# Patient Record
Sex: FEMALE | Race: WHITE
[De-identification: names, ages, dates, MRNs, and addresses within clinical notes are randomized per-mention and may not be internally consistent; named-entity substitution may affect disease eponyms.]

---

## 2022-06-16 ENCOUNTER — HOSPITAL ENCOUNTER (EMERGENCY)
Dept: HOSPITAL 97 - ER | Age: 57
Discharge: HOME | End: 2022-06-16
Payer: SELF-PAY

## 2022-06-16 VITALS — DIASTOLIC BLOOD PRESSURE: 81 MMHG | SYSTOLIC BLOOD PRESSURE: 159 MMHG | OXYGEN SATURATION: 98 %

## 2022-06-16 VITALS — TEMPERATURE: 97.7 F

## 2022-06-16 DIAGNOSIS — I10: ICD-10-CM

## 2022-06-16 DIAGNOSIS — Z88.0: ICD-10-CM

## 2022-06-16 DIAGNOSIS — R07.9: Primary | ICD-10-CM

## 2022-06-16 DIAGNOSIS — N28.9: ICD-10-CM

## 2022-06-16 DIAGNOSIS — E11.9: ICD-10-CM

## 2022-06-16 DIAGNOSIS — F17.210: ICD-10-CM

## 2022-06-16 LAB
BUN BLD-MCNC: 19 MG/DL (ref 7–18)
GLUCOSE SERPLBLD-MCNC: 183 MG/DL (ref 74–106)
HCT VFR BLD CALC: 41.2 % (ref 36–45)
LYMPHOCYTES # SPEC AUTO: 2.5 K/UL (ref 0.7–4.9)
PMV BLD: 7.9 FL (ref 7.6–11.3)
POTASSIUM SERPL-SCNC: 3.7 MMOL/L (ref 3.5–5.1)
RBC # BLD: 4.56 M/UL (ref 3.86–4.86)
TROPONIN I SERPL HS-MCNC: 43 PG/ML (ref ?–58.9)

## 2022-06-16 PROCEDURE — 80048 BASIC METABOLIC PNL TOTAL CA: CPT

## 2022-06-16 PROCEDURE — 36415 COLL VENOUS BLD VENIPUNCTURE: CPT

## 2022-06-16 PROCEDURE — 93005 ELECTROCARDIOGRAM TRACING: CPT

## 2022-06-16 PROCEDURE — 84484 ASSAY OF TROPONIN QUANT: CPT

## 2022-06-16 PROCEDURE — 71045 X-RAY EXAM CHEST 1 VIEW: CPT

## 2022-06-16 PROCEDURE — 85025 COMPLETE CBC W/AUTO DIFF WBC: CPT

## 2022-06-16 PROCEDURE — 99283 EMERGENCY DEPT VISIT LOW MDM: CPT

## 2022-06-16 NOTE — XMS REPORT
Continuity of Care Document

                            Created on:2022



Patient:BREANNA MITCHELL

Sex:Female

:1965

External Reference #:[id number]





Demographics







                          Address                   [address]

[address]



                                                    [place] [address]

 

                          Home Phone                [phone]

 

                          Work Phone                [phone]

 

                          Mobile Phone              [phone]

 

                          Email Address             [place]

 

                          Preferred Language        English

 

                          Marital Status            Unknown

 

                          Mu-ism Affiliation     Unknown

 

                          Race                      Unknown

 

                          Additional Race(s)        Unavailable

 

                          Ethnic Group              Unknown









Author







                          Organization              [facility]

t

 

                          Address                   [address]



                                                    [place] [address]

 

                          Phone                     [phone]









Support







                Name            Relationship    Address         Phone

 

                Raymond Unavailable     [address]       [phone]



                                                [place] [address] 

 

                Starr          Unavailable     Unavailable     [phone]









Care Team Providers







                    Name                Role                Phone

 

                    DORIAN Aguilar           Attending Clinician Unavailable

 

                    DORIAN Nelson MD     Attending Clinician [phone]

 

                    Doctor Unassigned,  Name Attending Clinician Unavailable









Payers







           Payer Name Policy Type Policy Number Effective Date Expiration Date S

ource







Problems

This patient has no known problems.



Allergies, Adverse Reactions, Alerts







       Allergy Allergy Status Severity Reaction(s) Onset  Inactive Treating Comm

ents 

Source



       Name   Type                        Date   Date   Clinician        

 

       Penicill Propensi Active        Unknown -                       Uni

vers



       ins    ty to                See comments 3-11                        ity 

of



              adverse                      00:00:                      [place]



              reaction                      00                          Medical



              s                                                       Branch

 

       penicill Adverse Active        Info Not                             Commo

n



       in     Reaction               Available                             Spiri

t



                                                                      - [facility]







Social History







           Social Habit Start Date Stop Date  Quantity   Comments   Source

 

           Alcohol intake                                             [facility]

 

           Sex Assigned At                                             [facility]                                                  Medical Branch

 

           Cigarettes smoked 2019                       [facility]



           current (pack per 00:00:00   00:00:00                         Medical

 Branch



           day) - Reported                                             









                Smoking Status  Start Date      Stop Date       Source

 

                Current every day smoker 2019 00:00:00                 Uni

[facility]







Medications







       Ordered Filled Start  Stop   Current Ordering Indication Dosage Frequency

 Signature

                    Comments            Components          Source



     Medication Medication Date Date Medication? Clinician                (SIG) 

          



     Name Name                                                   

 

     Jardiance Jardiance  2020- No   Dc                1 tablet       

    Common



               -15      Aguilar                               Spirit



               00:00: 00:00                                         - [facility]



               00   :00                                          [facility]

 

     Amoxicillin Amoxicillin 2020- No   [facility]                1 capsule  

         Common



                     Aguilar                               Spirit



               00:00: 00:00                                         - [facility]



               00   :00                                          [facility]

 

     barium      -0 2019- No             680g      680 g,           [facility]



     sulfate                                Oral,           ity of



     (LIQUID E-Z      15:30: 13:50                          ONCE, 1           Fei PINEDA) 60 %      00   :00                           dose, Tue           Med

ical



     (w/v) oral                                         19 at           [facility]



     suspension                                         1045,           



     680 g                                         Routine           

 

     ibuprofen            Yes                      Take  by           Univ

ers



     (ADVIL      4-16                               mouth.           ity of



     ORAL)      13:55:                                              [address]

 

     ibuprofen            Yes                      Take  by           Univ

ers



     (ADVIL      4-16                               mouth.           ity of



     ORAL)      13:55:                                              [address]

 

     ibuprofen            Yes                      Take  by           Univ

ers



     (ADVIL      4-16                               mouth.           ity of



     ORAL)      13:55:                                              [address]

 

     ibuprofen            Yes                      Take  by           Univ

ers



     (ADVIL      4-16                               mouth.           ity of



     ORAL)      13:55:                                              [address]

 

     glipiZIDE            Yes       [id number] 10mg      Take 1           U

nivers



     XL 10 mg 24      4-16                               tablet by           ity

 of



     hr tablet      00:00:                               mouth           [place]



               00                                 daily with           Medical



                                                  breakfast.           Branch

 

     metformin            Yes       [id number] 1000mg      Take 2          

 [facility]



      mg      4-16                               tablets by           ity 

of



     24 hr      00:00:                               mouth 2           [place]



     tablet      00                                 (two)           Medical



                                                  times           Branch



                                                  daily with           



                                                  meals.           

 

     glipiZIDE            Yes       [id number] 10mg      Take 1           U

nivers



     XL 10 mg 24      4-16                               tablet by           ity

 of



     hr tablet      00:00:                               mouth           [place]



               00                                 daily with           Medical



                                                  breakfast.           Branch

 

     metformin            Yes       [id number] 1000mg      Take 2          

 [facility]



      mg      4-16                               tablets by           ity 

of



     24 hr      00:00:                               mouth 2           [place]



     tablet      00                                 (two)           Medical



                                                  times           Branch



                                                  daily with           



                                                  meals.           

 

     glipiZIDE            Yes       [id number] 10mg      Take 1           U

nivers



     XL 10 mg 24      4-16                               tablet by           ity

 of



     hr tablet      00:00:                               mouth           [place]



               00                                 daily with           Medical



                                                  breakfast.           Branch

 

     metformin            Yes       [id number] 1000mg      Take 2          

 [facility]



      mg      4-16                               tablets by           ity 

of



     24 hr      00:00:                               mouth 2           [place]



     tablet      00                                 (two)           Medical



                                                  times           Branch



                                                  daily with           



                                                  meals.           

 

     glipiZIDE            Yes       [id number] 10mg      Take 1           U

nivers



     XL 10 mg 24      4-16                               tablet by           ity

 of



     hr tablet      00:00:                               mouth           [place]



               00                                 daily with           Medical



                                                  breakfast.           Branch

 

     metformin            Yes       [id number] 1000mg      Take 2          

 Univers



      mg      4-16                               tablets by           ity 

of



     24 hr      00:00:                               mouth 2           [place]



     tablet      00                                 (two)           Medical



                                                  times           Branch



                                                  daily with           



                                                  meals.           

 

     ALBUTEROL            Yes                      INHALE 1-2           Un

linda



     SULFATE HFA      3-12                               PUFFS Q           ity o

f



     INHALE      00:00:                               4-6 H           [place]



                                                               [facility]

 

     ALBUTEROL            Yes                      INHALE 1-2           Un

linda



     SULFATE HFA      3-12                               PUFFS Q           ity o

f



     INHALE      00:00:                               4-6 H           [place]



                                                               [facility]

 

     ALBUTEROL            Yes                      INHALE 1-2           Un

linda



     SULFATE HFA      3-12                               PUFFS Q           ity o

f



     INHALE      00:00:                               4-6 H           [place]



                                                               Medical



                                                                 [place]

 

     ALBUTEROL            Yes                      INHALE 1-2           Un

linda



     SULFATE HFA      3-12                               PUFFS Q           ity o

f



     INHALE      00:00:                               4-6 H           [place]



                                                               [facility]

 

     Lisinopril Lisinopril           Yes  Dc                1 tablet        

   Common



                              Aguilar                               [facility]

 

     Aspir-Low Aspir-Low           Yes  Dc                1 tablet          

 Common



                              Aguilar                               [facility]

 

     Ondansetron Ondansetron           Yes  Dc                1 tablet      

     Common



                              Aguilar                on the           Spirit



                                                  tongue and           - [facility]



                                                  allow to           [facility]

 

     Zinc Zinc           Yes  Dc                not            Common



                              Aguilar                defined           [facility]

 

     Magnesium Magnesium           Yes  Dc                not            Com

                  Aguilar                defined           [facility]

 

     Vitamin D3 Vitamin D3           Yes  Dc                not            C

ommon



                              Aguilar                defined           [facility]

 

     Januvia Januvia           Yes  Dc                1 tablet           Com

mon



                              Aguilar                               [facility]

 

     Famotidine Famotidine           Yes  Dc                1 tablet        

   Common



                              Aguilar                               [facility]

 

     Calcium Calcium           Yes  Dc                not            Common



                              Aguilar                defined           [facility]

 

     Ondansetron Ondansetron           Yes  Dc                TAKE 1        

   Common



     HCl  HCl                 Aguilar                TABLET BY           [facility]



                                                  MOUTH           - [facility]



                                                  EVERY 12           St



                                                  HOURS           [facility]

 

     Metoprolol Metoprolol           Yes  Dc                TAKE 1          

 Common



     Tartrate Tartrate                Aguilar                TABLET BY           S

pirit



                                                  MOUTH           - [facility]



                                                  TWICE           St



                                                  DAILY WITH           [facility]

 

     Omeprazole Omeprazole           Yes  Dc                TAKE ONE        

   Common



                              Aguilar                CAPSULE BY           [facility]



                                                  MOUTH           - [facility]



                                                  EVERY DAY           [facility]

 

     Hydroxychlo Hydroxychlo           Yes  Dc                1 tablet      

     Common



     roquine roquine                Aguilar                               Spirit



     Sulfate [facility]

 

     PredniSONE PredniSONE           Yes  Dc                1 tablet        

   Common



                              Aguilar                               [facility]

 

     Metoprolol Metoprolol           Yes  Dc                1 tablet        

   Common



     Tartrate Tartrate                Aguilar                with food           S

pirit



                                                                 [facility]

 

     Atorvastati Atorvastati           Yes  Dc                1 tablet      

     Common



     n Calcium n Calcium                Aguilar                               Spir

it



                                                                 [facility]

 

     Multi Multi           Yes  Dc                1 tablet           Common



     Vitamin Vitamin                Aguilar                               [facility]

 

     Multivitami Multivitami           Yes  Dc                not           

 Common



     n Adult n Adult                Aguilar                defined           [facility]

t



                                                                 [facility]







Procedures







                Procedure       Date / Time Performed Performing Clinician Jennifer KOROMA SMALL BOWEL SERIES 2019 15:28:48 Nik Nelson [facility]

 

                ASSIGNMENT OF BENEFITS 2019 13:20:20 Doctor Unassigned, No

 [facility]

 

                US ABDOMEN COMPLETE 2019 19:59:31 Nik Nelson [facility]

 

                ASSIGNMENT OF BENEFITS 2019 18:51:06 Doctor Unassigned, No

 [facility]







Encounters







        Start   End     Encounter Admission Attending Care    Care    Encounter 

Source



        Date/Time Date/Time Type    Type    Clinicians Facility Department ID   

   

 

        2022         Outpatient         Aguilar,  STLMLC  [facility]LC  [id number]

 Common



        08:58:00                         Dc                     [facility]

 

        2022         Outpatient         Aguilar,  STLMLC  STLMLC  [id number]

 Common



        14:05:27                         Dc                  [id number]   [facility]

 

        2022         Outpatient         Aguilar,  STLMLC  STLMLC  [id number]

 Common



        13:16:26                         Dc                  [id number]   [facility]

 

        2022         Outpatient         Aguilar,  STLMLC  STLC  [id number]

 Common



        12:42:00                         Dc                  [id number]   [facility]

 

        2022         Outpatient         Aguilar,  STLMLC  STLMLC  [id number]

 Common



        11:46:42                         Dc                  [id number]   [facility]

 

        2022         Outpatient         Aguilar,  STLMLC  STLMLC  [id number]

 Common



        11:25:36                         Dc                  [id number]   [facility]

 

        2022         Outpatient         Aguilar,  STLMLC  STLMLC  [id number]

 Common



        11:13:31                         Dc                  [id number]   [facility]

 

        2022         Outpatient         Aguilar,  STLMLC  STLMLC  [id number]

 Common



        11:13:02                         Dc                  [id number]   [facility]

 

        2022         Outpatient         Aguilar,  STLMLC  STLMLC  [id number]

 Common



        11:10:35                         Dc                  [id number]   [facility]

 

        2022         Outpatient         Aguilar,  STLMLC  STLMLC  [id number]

 Common



        11:10:00                         Dc                  [id number]   [facility]

 

        2022         Outpatient         Aguilar,  STLMLC  STLMLC  [id number]

 Common



        11:06:48                         Dc                  [id number]   [facility]

 

        2021 ambulatory                 STLMLC  STLMLC  [id number]

 Common



        00:00:00 00:00:00                                                 [facility]

 

        2021 Outpatient                 STLMLC  STLMLC  [id number]

 Common



        00:00:00 00:00:00                                                 [facility]

 

        2021 Outpatient                 STLMLC  STLMLC  [id number]

 Common



        00:00:00 00:00:00                                                 [facility]

 

        2021 Outpatient                 STLMLC  STLMLC  [id number]

 Common



        00:00:00 00:00:00                                                 [facility]

 

        2021 Outpatient                 STLMLC  STLMLC  [id number]

 Common



        00:00:00 00:00:00                                                 [facility]

 

        2021 Outpatient                 STLMLC  STLMLC  [id number]

 Common



        00:00:00 00:00:00                                                 [facility]

 

        2021 Outpatient                 STLMLC  STLMLC  [id number]

 Common



        00:00:00 00:00:00                                                 [facility]

 

        2020-11-10 2020-11-10 Outpatient                 STLMLC  STLMLC  [id number]

 Common



        00:00:00 00:00:00                                                 [facility]

 

        2020 Outpatient                 STLMLC  STLMLC  [id number]

 Common



        00:00:00 00:00:00                                                 [facility]

 

        2020 Outpatient                 STLMLC  STLMLC  [id number]

 Common



        00:00:00 00:00:00                                                 [facility]

 

        2020 Outpatient                 STLMLC  STLMLC  [id number]

 Common



        00:00:00 00:00:00                                                 [facility]

 

        2020 Outpatient                 [id number]

[id number] Common



        09:20:00 09:20:00                         [address]   [address]         Spir

it



                                                Drive   [facility]

 

        2020 Outpatient                 [id number]

[id number] Common



        14:15:00 14:15:00                         [place]   [address]         Spir

it



                                                Drive   [facility]

 

        2020 Outpatient                 [id number]

[id number] Common



        14:33:00 14:33:00                         [place]   [address]         Spir

it



                                                Drive   Family          [facility]

 

        2020 Outpatient                 [id number]

[id number] Common



        13:00:00 13:00:00                         [place]   [address]         Spir

it



                                                Drive   [facility]

 

        2020 Outpatient                 [id number]

[id number] Common



        10:30:00 10:30:00                         t [place]   [address]         Spir

it



                                                Drive   [facility]

 

        2020 Outpatient                 [id number]

[id number] Common



        14:35:00 14:35:00                         t [place]   [address]         Spir

it



                                                Drive   [facility]

 

        2019 Outpatient                 [id number]

[id number] Common



        10:00:00 10:00:00                         t [address]         Spir

it



                                                Drive   [facility]

 

        2019 Outpatient                 [id number]

[id number] Common



        08:01:00 08:01:00                          [address]         Spir

it



                                                Drive   [facility]

 

        2019 [facility]    [id number]

[id number] [facility]



        08:26:06 23:59:00 Encounter         Nik Howe [id number]        

 ity of



                                                [place] [id number] [place] [address]  [phone]         Medi

celia



                                                        807             Branch

 

        2019 Orders          Doctor  RENEA    [id number]

 [facility]



        00:00:00 00:00:00 Only            Unassigned, FRANCIA   [id number]       

  ity of



                                        [facility] [id number]         Marvin

as



                                                        [phone]         Medi

celia



                                                        [address]             Branch

 

        2019 [place] [id number]

[id number] [facility]



        13:53:35 23:59:00 Encounter         Nik Howe [id number]        

 ity of



                                                [place] [id number]         [facility]  [id number]         Medi

celia



                                                        [address]             [address]

 

        2019 Orders          Doctor  RENEA    [id number]

 [facility]



        00:00:00 00:00:00 Only            Unassigned, FRANCIA   [id number]       

  ity of



                                        [facility] [id number]         Marvin

as



                                                        [phone]         Medi

celia



                                                        [address]             Branch







Results







           Test       Test       Test       Results    Result     Source



           Description Time       Comments              Comments   

 

           FL SMALL BOWEL 2019-              HISTORY: Loss of weight.        

    [facility]     20                    TECHNIQUE: Abdominal            [place]

[facility]



                      15:34:21              radiographs were            Branch



                                            obtained. Subsequently            



                                            barium isadministered            



                                            orally and serial films            



                                            of the abdominal and            



                                            pelvis areobtained.            



                                            Digital spot images of            



                                            small bowel loops            



                                            including terminal            



                                            ileumwere obtained by            



                                            me. FINDINGS: 2            



                                            abdominal images showed            



                                            unremarkable intestinal            



                                            gas patternexcept for            



                                            mild constipation. No            



                                            calcified gallstones or            



                                            kidney stones.            



                                            Noaggressive bone            



                                            lesions. Barium flow            



                                            through the small bowel            



                                            appears unobstructed            



                                            with the rightside of            



                                            the colon visualized            



                                            within less than 1 hour.            



                                            Entire small            



                                            bowelincluding spot            



                                            films of the terminal            



                                            ileum show normal            



                                            mucosal pattern,caliber            



                                            as well as distribution            



                                            of small bowel loops in            



                                            the abdominal andpelvis.            



                                            CONCLUSIONS: Normal            



                                            small bowel series.            



                                            [facility], Radiant Results            



                                            Inft User - 2019            



                                            10:36 AM CDTHISTORY:            



                                            Loss of               



                                            weight.TECHNIQUE:            



                                            Abdominal radiographs            



                                            were obtained.            



                                            Subsequently barium            



                                            isadministered orally            



                                            and serial films of the            



                                            abdominal and pelvis            



                                            areobtained. Digital            



                                            spot images of small            



                                            bowel loops including            



                                            terminal ileumwere            



                                            obtained by me.FINDINGS:            



                                            2 abdominal images            



                                            showed unremarkable            



                                            intestinal gas            



                                            patternexcept for mild            



                                            constipation. No            



                                            calcified gallstones or            



                                            kidney stones.            



                                            Noaggressive bone            



                                            lesions.Barium flow            



                                            through the small bowel            



                                            appears unobstructed            



                                            with the rightside of            



                                            the colon visualized            



                                            within less than 1 hour.            



                                            Entire small            



                                            bowelincluding spot            



                                            films of the terminal            



                                            ileum show normal            



                                            mucosal pattern,caliber            



                                            as well as distribution            



                                            of small bowel loops in            



                                            the abdominal            



                                            andpelvis.CONCLUSIONS:            



                                            Normal small bowel            



                                            series.               

 

            ABDOMEN 2019-              * * * * * * * * ORIGINAL            

[facility]   08                    REPORT * * * * * * *            [facility]



                      20:05:56              *ULTRASOUND ABDOMEN:            [facility]





                                            HISTORY: Generalized            



                                            abdomen pain TECHNIQUE:            



                                            Sonographic evaluation            



                                            of the abdomen is            



                                            performed. FINDINGS:            



                                            Liver is normal in size,            



                                            shape and echotexture.            



                                            Liver measures 16cm. No            



                                            focal lesions are            



                                            present within the            



                                            liver. Portal venous            



                                            flow is inthe normal            



                                            direction. Portal vein            



                                            measures 11 mm. No            



                                            biliary tree dilationis            



                                            noted. Common bile duct            



                                            measures 2 mm.            



                                            Gallbladder is normal.            



                                            No gallstones, wall            



                                            thickening or            



                                            pericholecysticfluid is            



                                            present. Pancreas,            



                                            spleen are normal in            



                                            size shape and            



                                            echotexture.  Left            



                                            kidney is slightly            



                                            smaller compared to the            



                                            right. The echotexture            



                                            isnormal. No            



                                            hydronephrosis or            



                                            perirenal fluid is seen.            



                                            The right kidneymeasures            



                                            10.5 x 3.6 x 4.1 cm and            



                                            the left kidney measures            



                                            8.4 x 4.1 x 3.4cm. The            



                                            abdominal aorta is            



                                            normal in caliber. Mild            



                                            calcified plaque is seen            



                                            inthe abdominal aorta.            



                                            CONCLUSIONS:1. Size            



                                            discrepancy in the            



                                            kidneys with left being            



                                            smaller in            



                                            size.Examination is            



                                            otherwise within normal            



                                            [facility]            



                                            Results Inft User -            



                                            2019  3:08 PM CDT*            



                                            * * * * * * * ORIGINAL            



                                            REPORT * * * * * * *            



                                            *ULTRASOUND ABDOMEN:            



                                            HISTORY: Generalized            



                                            abdomen painTECHNIQUE:            



                                            Sonographic evaluation            



                                            of the abdomen is            



                                            performed.FINDINGS:            



                                            Liver is normal in size,            



                                            shape and echotexture.            



                                            Liver measures 16cm. No            



                                            focal lesions are            



                                            present within the            



                                            liver. Portal venous            



                                            flow is inthe normal            



                                            direction. Portal vein            



                                            measures 11 mm. No            



                                            biliary tree dilationis            



                                            noted. Common bile duct            



                                            measures 2 mm.            



                                            Gallbladder is normal.            



                                            No gallstones, wall            



                                            thickening or            



                                            pericholecysticfluid is            



                                            present.Pancreas, spleen            



                                            are normal in size shape            



                                            and echotexture. Left            



                                            kidney is slightly            



                                            smaller compared to the            



                                            right. The echotexture            



                                            isnormal. No            



                                            hydronephrosis or            



                                            perirenal fluid is seen.            



                                            The right kidneymeasures            



                                            10.5 x 3.6 x 4.1 cm and            



                                            the left kidney measures            



                                            8.4 x 4.1 x 3.4cm.The            



                                            abdominal aorta is            



                                            normal in caliber. Mild            



                                            calcified plaque is seen            



                                            inthe abdominal            



                                            aorta.CONCLUSIONS:1.            



                                            Size discrepancy in the            



                                            kidneys with left being            



                                            smaller in            



                                            size.Examination is            



                                            otherwise within normal            



                                            limits

## 2022-06-16 NOTE — ER
Nurse's Notes                                                                                     

 [facility]                                                                 

Name: Leonor Wallace                                                                  

Age: 57 yrs                                                                                       

Sex: Female                                                                                       

: 1965                                                                                   

MRN: [id number]                                                                                   

Arrival Date: 2022                                                                          

Time: 09:51                                                                                       

Account#: [id number]                                                                            

Bed 20                                                                                            

Private MD:                                                                                       

Diagnosis: Chest pain, unspecified;Renal insufficiency                                            

                                                                                                  

Presentation:                                                                                     

                                                                                             

09:52 Chief complaint: Patient states: pt out for blood pressure, cholesterol, and diabetes   ha  

      medications x2 weeks. pt reporting having chest pain and head aces. Coronavirus screen:     

      Vaccine status: Patient reports receiving the 2nd dose of the covid vaccine. Ebola          

      Screen: Patient denies travel to an Ebola-affected area in the 21 days before illness       

      onset. Initial Sepsis Screen: Does the patient meet any 2 criteria? No. Patient's           

      initial sepsis screen is negative. Does the patient have a suspected source of              

      infection? No. Patient's initial sepsis screen is negative. Risk Assessment: Do you         

      want to hurt yourself or someone else?. Onset of symptoms was 2022.                

09:52 Method Of Arrival: EMS: [facility]                                                       ha  

09:52 Acuity: MIGUELANGEL 3                                                                           ha  

                                                                                                  

Triage Assessment:                                                                                

10:05 General: Appears in no apparent distress. Behavior is calm, cooperative.                ha  

                                                                                                  

Historical:                                                                                       

- Allergies:                                                                                      

10:03 PENICILLINS;                                                                            ha  

- Home Meds:                                                                                      

10:03 metformin 500 mg Oral tab 1 tab 2 times per day [Active]; metoprolol succinate 25 mg    ha  

      oral CSpX 2 caps once daily [Active]; atorvastatin 20 mg oral tab 1 tab once daily          

      [Active];                                                                                   

- PMHx:                                                                                           

10:03 Diabetes - IDDM; Hypertensive disorder;                                                 ha  

                                                                                                  

- Immunization history:: Adult Immunizations up to date.                                          

- Social history:: Smoking status: Patient reports the use of cigarette tobacco                   

  products, smokes one-half pack cigarettes per day.                                              

                                                                                                  

                                                                                                  

Screening:                                                                                        

10:05 Abuse screen: Denies threats or abuse. Denies injuries from another. Nutritional        ha  

      screening: No deficits noted. Tuberculosis screening: No symptoms or risk factors           

      identified. Fall Risk None identified.                                                      

                                                                                                  

Assessment:                                                                                       

10:02 Pain: Complains of pain in headaches. Neuro: No deficits noted. Level of Consciousness  ha  

      is awake, alert, obeys commands, Oriented to person, place, time, situation.                

      Cardiovascular: Reports chest pain.                                                         

                                                                                                  

Vital Signs:                                                                                      

09:52  / 98; Pulse 87; Resp 18; Temp 97.7(T); Pulse Ox 100% ; Weight 72.57 kg; Height 5 stahl  

      ft. 4 in. (162.56 cm);                                                                      

10:56  / 70; Pulse 72; Resp 18; Pulse Ox 99% on R/A;                                    ha  

11:41  / 81; Pulse 78; Resp 17; Pulse Ox 98% ;                                          ha  

09:52 Body Mass Index 27.46 (72.57 kg, 162.56 cm)                                             ha  

                                                                                                  

ED Course:                                                                                        

09:51 Patient arrived in ED.                                                                  tw2 

09:52 Pamela Hedrick, RN is Primary Nurse.                                                ha  

09:54 Triage completed.                                                                       stahl  

09:54 Zaire Elizalde DO is Attending Physician.                                                ms3 

10:05 Patient has correct armband on for positive identification. Bed in low position.        ha  

10:05 Arm band placed on.                                                                     ha  

10:05 No provider procedures requiring assistance completed.                                  ha  

10:56 Inserted saline lock: 20 gauge in left antecubital area, using aseptic technique.       ha  

10:59 XRAY Chest (1 view) In Process Unspecified.                                             EDMS

11:45 Rusty Gomez MD is Referral Physician.                                               ms3 

11:55 IV discontinued, intact, Pressure dressing applied.                                     ha  

                                                                                                  

Administered Medications:                                                                         

No medications were administered                                                                  

                                                                                                  

                                                                                                  

Medication:                                                                                       

10:05 VIS not applicable for this client.                                                     ha  

                                                                                                  

Outcome:                                                                                          

11:45 Discharge ordered by MD.                                                                ms3 

11:55 Discharged to home ambulatory.                                                          ha  

11:55 Condition: good                                                                             

11:55 Discharge instructions given to patient.                                                    

11:55 Patient left the ED.                                                                    ha  

                                                                                                  

Signatures:                                                                                       

Dispatcher [employer]                           EDMS                                                 

Michelle Deleon RN                          RN   tw2                                                  

Zaire Elizalde DO                        DO   ms3                                                  

Pamela Hedrick RN                  RN   stahl                                                   

                                                                                                  

**************************************************************************************************

## 2022-06-16 NOTE — RAD REPORT
EXAM DESCRIPTION:  RAD - Chest Single View - 6/16/2022 10:57 am

 

CLINICAL HISTORY:  CHEST PAIN

Chest pain.

 

COMPARISON:  Chest Single View dated 6/10/2019; Chest Pa And Lat (2 Views) dated 3/12/2019; CHEST SIN
GLE VIEW dated 11/9/2015; CHEST SINGLE VIEW dated 2/26/2009

 

FINDINGS:  Portable technique limits examination quality.

 

The lungs are grossly clear. The heart is normal in size. No displaced fractures.

 

IMPRESSION:  No acute intrathoracic process suspected.

## 2022-06-16 NOTE — EDPHYS
Physician Documentation                                                                           

 [facility]                                                                 

Name: Leonor Wallace                                                                  

Age: 57 yrs                                                                                       

Sex: Female                                                                                       

: 1965                                                                                   

MRN: [id number]                                                                                   

Arrival Date: 2022                                                                          

Time: 09:51                                                                                       

Account#: [id number]                                                                            

Bed 20                                                                                            

Private MD:                                                                                       

ED Physician Zaire Elizalde                                                                         

HPI:                                                                                              

                                                                                             

10:52 This 57 yrs old Female presents to ER via EMS with complaints of chest pain.            ms3 

10:52 The patient or guardian reports chest pain that is located primarily in the substernal  ms3 

      area. Onset: last night. The pain does not radiate. Associated signs and symptoms:          

      Pertinent positives: headache, nausea, Pertinent negatives: diaphoresis, dizziness,         

      vomiting. The chest pain is described as squeezing. Duration: The patient or guardian       

      reports a single episode, that is still ongoing, but improving. Modifying factors: The      

      symptoms are alleviated by nothing. the symptoms are aggravated by nothing. Severity of     

      pain: At its worst the pain was moderate in the emergency department the pain has           

      improved markedly, is a 1 / 10.                                                             

                                                                                                  

Historical:                                                                                       

- Allergies:                                                                                      

10:03 PENICILLINS;                                                                            ha  

- Home Meds:                                                                                      

10:03 metformin 500 mg Oral tab 1 tab 2 times per day [Active]; metoprolol succinate 25 mg    ha  

      oral CSpX 2 caps once daily [Active]; atorvastatin 20 mg oral tab 1 tab once daily          

      [Active];                                                                                   

- PMHx:                                                                                           

10:03 Diabetes - IDDM; Hypertensive disorder;                                                 ha  

                                                                                                  

- Immunization history:: Adult Immunizations up to date.                                          

- Social history:: Smoking status: Patient reports the use of cigarette tobacco                   

  products, smokes one-half pack cigarettes per day.                                              

                                                                                                  

                                                                                                  

ROS:                                                                                              

10:52 Constitutional: Negative for fever, and chills. Eyes: Negative for injury, pain,        ms3 

      redness, and discharge, Cardiovascular: chest pain Respiratory: Negative for shortness      

      of breath, cough, wheezing, and pleuritic chest pain, Abdomen/GI: nausea Back: Negative     

      for injury and pain, MS/Extremity: Negative for injury and deformity, Skin: Negative        

      for injury, rash, and discoloration.                                                        

10:52 All other systems are negative.                                                             

                                                                                                  

Exam:                                                                                             

10:26 ECG was reviewed by the Attending Physician.                                            ms3 

10:52 Constitutional:  This is a well developed, well nourished patient who is awake, alert,  ms3 

      and in no acute distress. Head/Face:  Normocephalic, atraumatic. Neck:  Trachea             

      midline, no cervical lymphadenopathy.  Supple, full range of motion without nuchal          

      rigidity, or vertebral point tenderness.  No Meningismus. Chest/axilla:  Normal chest       

      wall appearance and motion.  Nontender with no deformity.   Cardiovascular:  Regular        

      rate and rhythm with a normal S1 and S2.  No gallops, murmurs, or rubs.  Normal PMI, no     

      JVD.  No pulse deficits. Respiratory:  Lungs have equal breath sounds bilaterally,          

      clear to auscultation and percussion.  No rales, rhonchi or wheezes noted.  No              

      increased work of breathing, no retractions or nasal flaring. Abdomen/GI:  Soft,            

      non-tender, with normal bowel sounds.  No distension or tympany.  No guarding or            

      rebound.  No evidence of tenderness throughout. Back:  No spinal tenderness.  No            

      costovertebral tenderness.  Full range of motion. Skin:  Warm, dry with normal turgor.      

      Normal color with no rashes, no lesions, and no evidence of cellulitis. MS/ Extremity:      

      Pulses equal, no cyanosis.  Neurovascular intact.  Full, normal range of motion. Psych:     

       Awake, alert, with orientation to person, place and time.  Behavior, mood, and affect      

      are within normal limits.                                                                   

                                                                                                  

Vital Signs:                                                                                      

09:52  / 98; Pulse 87; Resp 18; Temp 97.7(T); Pulse Ox 100% ; Weight 72.57 kg; Height 5 stahl  

      ft. 4 in. (162.56 cm);                                                                      

10:56  / 70; Pulse 72; Resp 18; Pulse Ox 99% on R/A;                                    ha  

11:41  / 81; Pulse 78; Resp 17; Pulse Ox 98% ;                                          ha  

09:52 Body Mass Index 27.46 (72.57 kg, 162.56 cm)                                             ha  

                                                                                                  

MDM:                                                                                              

10:10 Patient medically screened.                                                             ms3 

10:26 Differential diagnosis: abnormal EKG, acute myocardial infarction, anxiety, coronary    ms3 

      artery disease gastroesophageal reflux disease (GERD).                                      

11:50 HEART Score: History: Slightly Suspicious (0), ECG: Non specific repolarization         ms3 

      disturbance / LBTB / PM (1), Age: > 45 and < 65 years (1), Risk Factors: > or = 3 Risk      

      factors for atherosclerotic disease (2), [Hypertension] [DM] [Active Smoker] Troponin:      

      < or = 1 x Normal Limit (0), Total Score = 4. Data reviewed: vital signs, nurses notes,     

      lab test result(s), EKG, radiologic studies. Data interpreted: Cardiac monitor: rate is     

      78 beats/min, rhythm is normal sinus rhythm, with no ectopy, Interpretation: normal         

      rate, normal rhythm. Counseling: I had a detailed discussion with the patient and/or        

      guardian regarding: the historical points, exam findings, and any diagnostic results        

      supporting the discharge/admit diagnosis, lab results, radiology results, the need for      

      outpatient follow up, a cardiologist, to return to the emergency department if symptoms     

      worsen or persist or if there are any questions or concerns that arise at home. ED          

      course: Discussed labs, chest x-ray, EKG, physical exam findings with patient. Patient      

      to follow-up with her cardiologist as discussed. All questions were answered. Return        

      precautions discussed include worsening symptoms, or any other concerns. On                 

      reevaluation patient is improved, alert and oriented x4, no apparent distress,              

      nontoxic, ambulatory in emergency department, speaking full sentences..                     

                                                                                                  

                                                                                             

10:29 Order name: Basic Metabolic Panel; Complete Time: 11:20                                 ms3 

                                                                                             

10:29 Order name: CBC with Diff; Complete Time: 11:20                                         ms3 

                                                                                             

10:29 Order name: Troponin HS; Complete Time: 11:20                                           ms3 

                                                                                             

10:29 Order name: XRAY Chest (1 view); Complete Time: 11:20                                   ms3 

                                                                                             

10:29 Order name: Cardiac monitoring; Complete Time: 10:35                                    ms3 

                                                                                             

10:29 Order name: EKG - Nurse/Tech; Complete Time: 10:35                                      ms3 

                                                                                             

10:29 Order name: IV Saline Lock; Complete Time: 10:56                                        ms3 

                                                                                             

10:29 Order name: Labs collected and sent; Complete Time: 10:56                               ms3 

                                                                                             

10:29 Order name: O2 Per Protocol; Complete Time: 10:35                                       ms3 

                                                                                             

10:29 Order name: O2 Sat Monitoring; Complete Time: 10:35                                     ms3 

                                                                                                  

ECG:                                                                                              

10:26 Rate is 71 beats/min. Rhythm is regular. QRS Axis is Normal. WA interval is normal.     ms3 

      Clinical impression: NSR w/ Non-specific ST/T Changes. Interpreted by me.                   

                                                                                                  

Administered Medications:                                                                         

No medications were administered                                                                  

                                                                                                  

                                                                                                  

Disposition Summary:                                                                              

22 11:45                                                                                    

Discharge Ordered                                                                                 

      Location: Home                                                                          ms3 

      Condition: Stable                                                                       ms3 

      Diagnosis                                                                                   

        - Chest pain, unspecified                                                             ms3 

        - Renal insufficiency                                                                 ms3 

      Followup:                                                                               ms3 

        - With: Rusty Gomez MD                                                                 

        - When: 2 - 3 days                                                                         

        - Reason: Re-evaluation by your physician                                                  

      Discharge Instructions:                                                                     

        - Discharge Summary Sheet                                                             ms3 

        - Nonspecific Chest Pain, Adult                                                       ms3 

      Forms:                                                                                      

        - Medication Reconciliation Form                                                      ms3 

        - Thank You Letter                                                                    ms3 

        - Antibiotic Education                                                                ms3 

        - Prescription Opioid Use                                                             ms3 

Signatures:                                                                                       

Dispatcher [employer]                           EDMS                                                 

Zaire Elizalde,                         DO   ms3                                                  

Deanna-Pamela Pisano RN                  RN   stahl                                                   

                                                                                                  

**************************************************************************************************

## 2022-06-18 NOTE — EKG
Test Date:    2022-06-16               Test Time:    10:26:45

Technician:   FAISAL                                   

                                                     

MEASUREMENT RESULTS:                                       

Intervals:                                           

Rate:         71                                     

SD:           130                                    

QRSD:         94                                     

QT:           406                                    

QTc:          441                                    

Axis:                                                

P:            72                                     

SD:           130                                    

QRS:          2                                      

T:            -8                                     

                                                     

INTERPRETIVE STATEMENTS:                                       

                                                     

Normal sinus rhythm

Left ventricular hypertrophy with repolarization abnormality

Abnormal ECG

No previous ECG available for comparison



Electronically Signed On 06-18-22 08:55:45 CDT by Rusty Gomez